# Patient Record
Sex: MALE | Race: WHITE | NOT HISPANIC OR LATINO | Employment: FULL TIME | URBAN - METROPOLITAN AREA
[De-identification: names, ages, dates, MRNs, and addresses within clinical notes are randomized per-mention and may not be internally consistent; named-entity substitution may affect disease eponyms.]

---

## 2017-10-10 ENCOUNTER — OFFICE VISIT (OUTPATIENT)
Dept: SLEEP MEDICINE | Facility: HOSPITAL | Age: 42
End: 2017-10-10

## 2017-10-10 VITALS
BODY MASS INDEX: 32.58 KG/M2 | DIASTOLIC BLOOD PRESSURE: 82 MMHG | WEIGHT: 262 LBS | HEART RATE: 64 BPM | SYSTOLIC BLOOD PRESSURE: 142 MMHG | HEIGHT: 75 IN | OXYGEN SATURATION: 98 %

## 2017-10-10 DIAGNOSIS — G47.33 OBSTRUCTIVE SLEEP APNEA, ADULT: ICD-10-CM

## 2017-10-10 DIAGNOSIS — G47.31 CENTRAL SLEEP APNEA: Primary | ICD-10-CM

## 2017-10-10 PROBLEM — I10 HYPERTENSION: Status: ACTIVE | Noted: 2017-10-10

## 2017-10-10 PROCEDURE — 99213 OFFICE O/P EST LOW 20 MIN: CPT | Performed by: INTERNAL MEDICINE

## 2017-10-10 NOTE — PROGRESS NOTES
"Subjective   Steven Hutson is a 41 y.o. male is here today for follow-up.  He is followed here with central sleep apnea.  His primary care physician is Dr. Dias.    History of Present Illness  Patient was last seen September 30, 2016.  He had a history as Central sleep apnea and is been on an ASV device.  He has history of obesity and hypertension.  He states he's been doing very well with his machine.  He denies any problems with machine or mask.  He continues to feel more rested when he uses machine.  Past Medical History:   Diagnosis Date   • Hypertension        Past Surgical History:   Procedure Laterality Date   • KNEE SURGERY             Current Outpatient Prescriptions:   •  allopurinol (ZYLOPRIM) 300 MG tablet, Take 300 mg by mouth daily., Disp: , Rfl:   •  amLODIPine (NORVASC) 10 MG tablet, , Disp: , Rfl:   •  Omega-3 Fatty Acids (FISH OIL) 1000 MG capsule capsule, Take  by mouth daily with breakfast., Disp: , Rfl:   •  vitamin C (ASCORBIC ACID) 500 MG tablet, Take 500 mg by mouth daily., Disp: , Rfl:     No Known Allergies    The following portions of the patient's history were reviewed and updated as appropriate: allergies, current medications and problem list.    Review of Systems   Constitutional: Positive for diaphoresis.   HENT: Positive for congestion.    Eyes: Negative.    Respiratory: Negative.    Cardiovascular: Negative.    Gastrointestinal: Negative.    Endocrine: Negative.    Genitourinary: Negative.    Musculoskeletal: Negative.    Skin: Negative.    Allergic/Immunologic: Positive for environmental allergies.   Neurological: Negative.    Hematological: Negative.    Psychiatric/Behavioral: Negative.    [place] scores 5/24    Objective     /82  Pulse 64  Ht 75\" (190.5 cm)  Wt 262 lb (119 kg)  SpO2 98%  BMI 32.75 kg/m2    Physical Exam   Constitutional: He is oriented to person, place, and time. He appears well-developed and well-nourished.   He is obese.   HENT:   Head: Normocephalic " and atraumatic.   He has Mallampati class IV anatomy and mild retrognathia.   Eyes: EOM are normal. Pupils are equal, round, and reactive to light.   Neck: Normal range of motion. Neck supple.   Cardiovascular: Normal rate, regular rhythm and normal heart sounds.    Pulmonary/Chest: Effort normal and breath sounds normal.   Abdominal: Soft. Bowel sounds are normal.   Musculoskeletal: Normal range of motion. He exhibits no edema.   Neurological: He is alert and oriented to person, place, and time.   Skin: Skin is warm and dry.   Psychiatric: He has a normal mood and affect. His behavior is normal.    download from his machine for the past 180 days shows usage every day.  He uses it 7 hours 30 minutes per day.  His AHI is normal at 0.8      Assessment/Plan   Steven was seen today for follow-up.    Diagnoses and all orders for this visit:    Central sleep apnea    Obstructive sleep apnea, adult  -     CPAP Therapy    Patient seems be doing very well on this current CPAP pressures.  We will renew his supplies.  He is encouraged to lose weight.  He is encouraged to avoid alcohol and sedatives close to bedtime.  He is encouraged practice lateral position sleep.  We will see him back in 1 year.  He is to contact us earlier symptoms worsen.             Asif Cavazos MD [facility]  Sleep Medicine  Pulmonary and Critical Care Medicine      10/10/17  10:43 AM

## 2017-10-10 NOTE — PATIENT INSTRUCTIONS
Sleep Apnea  Sleep apnea is a condition in which breathing pauses or becomes shallow during sleep. Episodes of sleep apnea usually last 10 seconds or longer, and they may occur as many as 20 times an hour. Sleep apnea disrupts your sleep and keeps your body from getting the rest that it needs. This condition can increase your risk of certain health problems, including:  · Heart attack.  · Stroke.  · Obesity.  · Diabetes.  · Heart failure.  · Irregular heartbeat.  There are three kinds of sleep apnea:  · Obstructive sleep apnea. This kind is caused by a blocked or collapsed airway.  · Central sleep apnea. This kind happens when the part of the brain that controls breathing does not send the correct signals to the muscles that control breathing.  · Mixed sleep apnea. This is a combination of obstructive and central sleep apnea.  CAUSES  The most common cause of this condition is a collapsed or blocked airway. An airway can collapse or become blocked if:  · Your throat muscles are abnormally relaxed.  · Your tongue and tonsils are larger than normal.  · You are overweight.  · Your airway is smaller than normal.  RISK FACTORS  This condition is more likely to develop in people who:  · Are overweight.  · Smoke.  · Have a smaller than normal airway.  · Are elderly.  · Are male.  · Drink alcohol.  · Take sedatives or tranquilizers.  · Have a family history of sleep apnea.  SYMPTOMS  Symptoms of this condition include:  · Trouble staying asleep.  · Daytime sleepiness and tiredness.  · Irritability.  · Loud snoring.  · Morning headaches.  · Trouble concentrating.  · Forgetfulness.  · Decreased interest in sex.  · Unexplained sleepiness.  · Mood swings.  · Personality changes.  · Feelings of depression.  · Waking up often during the night to urinate.  · Dry mouth.  · Sore throat.  DIAGNOSIS  This condition may be diagnosed with:  · A medical history.  · A physical exam.  · A series of tests that are done while you are  sleeping (sleep study). These tests are usually done in a sleep lab, but they may also be done at home.  TREATMENT  Treatment for this condition aims to restore normal breathing and to ease symptoms during sleep. It may involve managing health issues that can affect breathing, such as high blood pressure or obesity. Treatment may include:  · Sleeping on your side.  · Using a decongestant if you have nasal congestion.  · Avoiding the use of depressants, including alcohol, sedatives, and narcotics.  · Losing weight if you are overweight.  · Making changes to your diet.  · Quitting smoking.  · Using a device to open your airway while you sleep, such as:    An oral appliance. This is a custom-made mouthpiece that shifts your lower jaw forward.    A continuous positive airway pressure (CPAP) device. This device delivers oxygen to your airway through a mask.    A nasal expiratory positive airway pressure (EPAP) device. This device has valves that you put into each nostril.    A bi-level positive airway pressure (BPAP) device. This device delivers oxygen to your airway through a mask.  · Surgery if other treatments do not work. During surgery, excess tissue is removed to create a wider airway.  It is important to get treatment for sleep apnea. Without treatment, this condition can lead to:  · High blood pressure.  · Coronary artery disease.  · (Men) An inability to achieve or maintain an erection (impotence).  · Reduced thinking abilities.  HOME CARE INSTRUCTIONS  · Make any lifestyle changes that your health care provider recommends.  · Eat a healthy, well-balanced diet.  · Take over-the-counter and prescription medicines only as told by your health care provider.  · Avoid using depressants, including alcohol, sedatives, and narcotics.  · Take steps to lose weight if you are overweight.  · If you were given a device to open your airway while you sleep, use it only as told by your health care provider.  · Do not use any  tobacco products, such as cigarettes, chewing tobacco, and e-cigarettes. If you need help quitting, ask your health care provider.  · Keep all follow-up visits as told by your health care provider. This is important.  SEEK MEDICAL CARE IF:  · The device that you received to open your airway during sleep is uncomfortable or does not seem to be working.  · Your symptoms do not improve.  · Your symptoms get worse.  SEEK IMMEDIATE MEDICAL CARE IF:  · You develop chest pain.  · You develop shortness of breath.  · You develop discomfort in your back, arms, or stomach.  · You have trouble speaking.  · You have weakness on one side of your body.  · You have drooping in your face.  These symptoms may represent a serious problem that is an emergency. Do not wait to see if the symptoms will go away. Get medical help right away. Call your local emergency services (911 in the [place] Do not drive yourself to the hospital.     This information is not intended to replace advice given to you by your health care provider. Make sure you discuss any questions you have with your health care provider.     Document Released: 12/08/2003 Document Revised: 04/10/2017 Document Reviewed: 09/26/2016  [employer] Interactive Patient Education ©2017 [employer]

## 2018-09-27 ENCOUNTER — OFFICE VISIT (OUTPATIENT)
Dept: SLEEP MEDICINE | Facility: HOSPITAL | Age: 43
End: 2018-09-27

## 2018-09-27 VITALS
DIASTOLIC BLOOD PRESSURE: 93 MMHG | HEART RATE: 77 BPM | BODY MASS INDEX: 31.68 KG/M2 | OXYGEN SATURATION: 97 % | HEIGHT: 75 IN | SYSTOLIC BLOOD PRESSURE: 150 MMHG | WEIGHT: 254.8 LBS

## 2018-09-27 DIAGNOSIS — G47.33 OSA (OBSTRUCTIVE SLEEP APNEA): Primary | ICD-10-CM

## 2018-09-27 PROCEDURE — 99213 OFFICE O/P EST LOW 20 MIN: CPT | Performed by: NURSE PRACTITIONER

## 2018-09-27 RX ORDER — TESTOSTERONE CYPIONATE 200 MG/ML
INJECTION, SOLUTION INTRAMUSCULAR
Refills: 1 | COMMUNITY
Start: 2018-06-29

## 2018-09-27 RX ORDER — NEEDLES, DISPOSABLE 25GX5/8"
NEEDLE, DISPOSABLE MISCELLANEOUS
Refills: 3 | COMMUNITY
Start: 2018-07-06

## 2018-09-27 RX ORDER — ROSUVASTATIN CALCIUM 40 MG/1
TABLET, COATED ORAL
Refills: 3 | COMMUNITY
Start: 2018-06-28

## 2018-09-27 NOTE — PATIENT INSTRUCTIONS

## 2018-09-27 NOTE — PROGRESS NOTES
"Subjective: Follow-up        Chief Complaint:   Chief Complaint   Patient presents with   • Follow-up       HPI:    Steven Hutson is a 42 y.o. male here for follow-up of central sleep apnea.  Patient is currently using an ASV, he was last seen here 10/10/17.  Patient otherwise is doing very well with the ASV therapy.  He is sleeping approximately 6 hours nightly and feels very refreshed upon awakening.  His [place] score 0/24.  He does not snore.  He does not nap.  He thinks he is doing very well on his current therapy and wishes to continue.        Current medications are:   Current Outpatient Prescriptions:   •  allopurinol (ZYLOPRIM) 300 MG tablet, Take 300 mg by mouth daily., Disp: , Rfl:   •  amLODIPine (NORVASC) 10 MG tablet, , Disp: , Rfl:   •  B-D DISP NEEDLE 09EK7-1/4\" 23G X 1-1/4\" misc, , Disp: , Rfl: 3  •  Omega-3 Fatty Acids (FISH OIL) 1000 MG capsule capsule, Take  by mouth daily with breakfast., Disp: , Rfl:   •  rosuvastatin (CRESTOR) 40 MG tablet, , Disp: , Rfl: 3  •  Testosterone Cypionate (DEPOTESTOTERONE CYPIONATE) 200 MG/ML injection, , Disp: , Rfl: 1  •  vitamin C (ASCORBIC ACID) 500 MG tablet, Take 500 mg by mouth daily., Disp: , Rfl: .      The patient's relevant past medical, surgical, family and social history were reviewed and updated in Epic as appropriate.       Review of Systems   Constitutional: Negative for diaphoresis and fatigue.   HENT: Positive for congestion, postnasal drip and rhinorrhea.    Eyes: Positive for visual disturbance.   Respiratory: Positive for apnea and cough.    Psychiatric/Behavioral: Positive for sleep disturbance.   All other systems reviewed and are negative.        Objective:    Physical Exam   Constitutional: He is oriented to person, place, and time. He appears well-developed and well-nourished.   HENT:   Head: Normocephalic and atraumatic.   Mouth/Throat: Oropharynx is clear and moist.   Mallampati 4 anatomy   Eyes: Conjunctivae are normal.   Neck: Neck " supple. No thyromegaly present.   Cardiovascular: Normal rate and regular rhythm.    Pulmonary/Chest: Effort normal and breath sounds normal.   Lymphadenopathy:     He has no cervical adenopathy.   Neurological: He is alert and oriented to person, place, and time.   Skin: Skin is warm and dry.   Psychiatric: He has a normal mood and affect. His behavior is normal. Judgment and thought content normal.   Nursing note and vitals reviewed.    Download a compliance reviewed with patient.  Usage greater than 4 hours is 98.9% to controlled AHI of 0.8.    ASSESSMENT/PLAN    Steven was seen today for follow-up.    Diagnoses and all orders for this visit:    [place] (obstructive sleep apnea)  -     CPAP Therapy            1. Counseled patient regarding multimodal approach with healthy nutrition, healthy sleep, regular physical activity, social activities, counseling, and medications. Encouraged to practice lateral sleep position. Avoid alcohol and sedatives close to bedtime.  2. Refill supplies ×1 year.  3. Return to clinic one year or sooner if symptoms warrant.    I have reviewed the results of my evaluation and impression and discussed my recommendations in detail with the patient.      Signed by  PAUL Lindsay    September 27, 2018      CC: Sarah Dias MD          No ref. provider found

## 2019-09-16 ENCOUNTER — OFFICE VISIT (OUTPATIENT)
Dept: SLEEP MEDICINE | Facility: HOSPITAL | Age: 44
End: 2019-09-16

## 2019-09-16 VITALS
BODY MASS INDEX: 31.83 KG/M2 | WEIGHT: 256 LBS | OXYGEN SATURATION: 98 % | HEIGHT: 75 IN | HEART RATE: 61 BPM | DIASTOLIC BLOOD PRESSURE: 83 MMHG | SYSTOLIC BLOOD PRESSURE: 146 MMHG

## 2019-09-16 DIAGNOSIS — G47.31 CENTRAL SLEEP APNEA: Primary | ICD-10-CM

## 2019-09-16 PROCEDURE — 99212 OFFICE O/P EST SF 10 MIN: CPT | Performed by: NURSE PRACTITIONER

## 2019-09-16 NOTE — PROGRESS NOTES
"    Chief Complaint:   Chief Complaint   Patient presents with   • Follow-up       HPI:    Steven Hutson is a 43 y.o. male here for follow-up of central sleep apnea.  Patient was last seen 9/27/2018.  Patient continues with ASV therapy.  Patient sleeps 6 to 8 hours nightly and feels refreshed upon awakening.  Patient has no excessive daytime sleepiness.  Patient has an [place] score of 0/24.  Patient has no concerns or questions today and wishes to continue with ASV.        Current medications are:   Current Outpatient Medications:   •  allopurinol (ZYLOPRIM) 300 MG tablet, Take 300 mg by mouth daily., Disp: , Rfl:   •  amLODIPine (NORVASC) 10 MG tablet, , Disp: , Rfl:   •  B-D DISP NEEDLE 52LG8-4/4\" 23G X 1-1/4\" misc, , Disp: , Rfl: 3  •  Omega-3 Fatty Acids (FISH OIL) 1000 MG capsule capsule, Take  by mouth daily with breakfast., Disp: , Rfl:   •  rosuvastatin (CRESTOR) 40 MG tablet, , Disp: , Rfl: 3  •  Testosterone Cypionate (DEPOTESTOTERONE CYPIONATE) 200 MG/ML injection, , Disp: , Rfl: 1  •  vitamin C (ASCORBIC ACID) 500 MG tablet, Take 500 mg by mouth daily., Disp: , Rfl: .      The patient's relevant past medical, surgical, family and social history were reviewed and updated in Epic as appropriate.       Review of Systems   Eyes: Positive for visual disturbance.   Respiratory: Positive for apnea.    Psychiatric/Behavioral: Positive for sleep disturbance.   All other systems reviewed and are negative.        Objective:    Physical Exam   Constitutional: He is oriented to person, place, and time. He appears well-developed and well-nourished.   HENT:   Head: Normocephalic and atraumatic.   Mouth/Throat: Oropharynx is clear and moist.   Mallampati 4 anatomy   Eyes: Conjunctivae are normal.   Neck: Neck supple. No thyromegaly present.   Cardiovascular: Normal rate and regular rhythm.   Pulmonary/Chest: Effort normal and breath sounds normal.   Lymphadenopathy:     He has no cervical adenopathy.   Neurological: He " is alert and oriented to person, place, and time.   Skin: Skin is warm and dry.   Psychiatric: He has a normal mood and affect. His behavior is normal. Judgment and thought content normal.   Nursing note and vitals reviewed.  180/1 80 days of use.  Greater than 4-hour use 100%.  90% EPAP 9.7.  90% pressure support 2.0.  AHI 0.6.  Download reviewed with patient.      ASSESSMENT/PLAN    Steven was seen today for follow-up.    Diagnoses and all orders for this visit:    Central sleep apnea  -     CPAP Therapy            1. Counseled patient regarding multimodal approach with healthy nutrition, healthy sleep, regular physical activity, social activities, counseling, and medications. Encouraged to practice lateral sleep position. Avoid alcohol and sedatives close to bedtime.  2. Refill supplies x1 year.  Return to clinic 1 year or sooner if symptoms warrant.    I have reviewed the results of my evaluation and impression and discussed my recommendations in detail with the patient.      Signed by  PAUL Lindsay    September 16, 2019      CC: Sarah Dias MD          No ref. provider found

## 2020-10-19 ENCOUNTER — TELEMEDICINE (OUTPATIENT)
Dept: SLEEP MEDICINE | Facility: HOSPITAL | Age: 45
End: 2020-10-19

## 2020-10-19 VITALS — WEIGHT: 260 LBS | HEIGHT: 75 IN | BODY MASS INDEX: 32.33 KG/M2

## 2020-10-19 DIAGNOSIS — G47.31 PRIMARY CENTRAL SLEEP APNEA: Primary | ICD-10-CM

## 2020-10-19 PROCEDURE — 99212 OFFICE O/P EST SF 10 MIN: CPT | Performed by: NURSE PRACTITIONER

## 2020-10-19 NOTE — PROGRESS NOTES
"    Chief Complaint:   Chief Complaint   Patient presents with   • Follow-up       HPI:    Steven Hutson is a 44 y.o. male here for follow-up of central sleep apnea.  Patient was last seen 9/16/2019 and is on ASV therapy.  Patient states he is doing very well.  Patient is sleeping 6 to 8 hours nightly and does feel rested upon awakening.  Patient will go to sleep within 15 minutes and does not get up during the night.  Patient has an [place] score of 0/24.  Patient denies any new diagnoses.  Patient has no complaints and does wish to continue ASV therapy.      Current medications are:   Current Outpatient Medications:   •  allopurinol (ZYLOPRIM) 300 MG tablet, Take 300 mg by mouth daily., Disp: , Rfl:   •  amLODIPine (NORVASC) 10 MG tablet, , Disp: , Rfl:   •  B-D DISP NEEDLE 64IM3-0/4\" 23G X 1-1/4\" misc, , Disp: , Rfl: 3  •  Omega-3 Fatty Acids (FISH OIL) 1000 MG capsule capsule, Take  by mouth daily with breakfast., Disp: , Rfl:   •  rosuvastatin (CRESTOR) 40 MG tablet, , Disp: , Rfl: 3  •  Testosterone Cypionate (DEPOTESTOTERONE CYPIONATE) 200 MG/ML injection, , Disp: , Rfl: 1  •  vitamin C (ASCORBIC ACID) 500 MG tablet, Take 500 mg by mouth daily., Disp: , Rfl: .      The patient's relevant past medical, surgical, family and social history were reviewed and updated in Epic as appropriate.       Review of Systems   Eyes: Positive for visual disturbance.   Psychiatric/Behavioral: Positive for sleep disturbance.   All other systems reviewed and are negative.        Objective:    Physical Exam  Constitutional:       Appearance: Normal appearance. He is obese.   HENT:      Head: Normocephalic and atraumatic.      Mouth/Throat:      Comments: Class 4 airway  Eyes:      Conjunctiva/sclera: Conjunctivae normal.   Pulmonary:      Effort: Pulmonary effort is normal. No respiratory distress.   Skin:     Coloration: Skin is not pale.      Findings: No erythema.   Neurological:      Mental Status: He is alert and oriented to " person, place, and time.   Psychiatric:         Mood and Affect: Mood normal.         Behavior: Behavior normal.         Thought Content: Thought content normal.         Judgment: Judgment normal.     90/90 days of use  Greater than 4-hour use 100%  90% EPAP 9.3  90% pressure support 2.3  Periodic breathing 0%  AHI 0.7      ASSESSMENT/PLAN    Diagnoses and all orders for this visit:    1. Primary central sleep apnea (Primary)  -     CPAP Therapy            1. Counseled patient regarding multimodal approach with healthy nutrition, healthy sleep, regular physical activity, social activities, counseling, and medications. Encouraged to practice lateral sleep position. Avoid alcohol and sedatives close to bedtime.  2. Refill supplies x1 year.  Return to clinic 1 year or sooner if symptoms warrant.  Patient gave verbal consent for video visit    I have reviewed the results of my evaluation and impression and discussed my recommendations in detail with the patient.      Signed by  PAUL Lindsay    October 19, 2020      CC: Sarah Dias MD          No ref. provider found

## 2021-10-19 ENCOUNTER — OFFICE VISIT (OUTPATIENT)
Dept: SLEEP MEDICINE | Facility: HOSPITAL | Age: 46
End: 2021-10-19

## 2021-10-19 VITALS
SYSTOLIC BLOOD PRESSURE: 161 MMHG | WEIGHT: 263.6 LBS | HEIGHT: 75 IN | OXYGEN SATURATION: 97 % | HEART RATE: 67 BPM | DIASTOLIC BLOOD PRESSURE: 85 MMHG | BODY MASS INDEX: 32.78 KG/M2

## 2021-10-19 DIAGNOSIS — G47.31 CENTRAL SLEEP APNEA: Primary | ICD-10-CM

## 2021-10-19 PROCEDURE — 99213 OFFICE O/P EST LOW 20 MIN: CPT | Performed by: NURSE PRACTITIONER

## 2021-10-19 NOTE — PROGRESS NOTES
"    Chief Complaint:   Chief Complaint   Patient presents with   • Follow-up       HPI:    Steven Hutson is a 45 y.o. male here for follow-up of central sleep apnea.  Patient has a history of hypertension and hyperlipidemia.  Patient was last seen 10/1920 and was doing well with ASV therapy.  Patient is sleeping 6 to 8 hours nightly and does feel refreshed upon awakening.  Patient will go to sleep within 15 minutes and does not get up during the night.  Patient is having no concerns or complaints and wishes to continue ASV.        Current medications are:   Current Outpatient Medications:   •  allopurinol (ZYLOPRIM) 300 MG tablet, Take 300 mg by mouth daily., Disp: , Rfl:   •  amLODIPine (NORVASC) 10 MG tablet, , Disp: , Rfl:   •  B-D DISP NEEDLE 92ME0-4/4\" 23G X 1-1/4\" misc, , Disp: , Rfl: 3  •  Omega-3 Fatty Acids (FISH OIL) 1000 MG capsule capsule, Take  by mouth daily with breakfast., Disp: , Rfl:   •  rosuvastatin (CRESTOR) 40 MG tablet, , Disp: , Rfl: 3  •  Testosterone Cypionate (DEPOTESTOTERONE CYPIONATE) 200 MG/ML injection, , Disp: , Rfl: 1  •  vitamin C (ASCORBIC ACID) 500 MG tablet, Take 500 mg by mouth daily., Disp: , Rfl: .      The patient's relevant past medical, surgical, family and social history were reviewed and updated in Epic as appropriate.       Review of Systems   Eyes: Positive for visual disturbance.   Respiratory: Positive for apnea.    All other systems reviewed and are negative.        Objective:    Physical Exam  Constitutional:       Appearance: Normal appearance.   HENT:      Head: Normocephalic and atraumatic.      Mouth/Throat:      Pharynx: Oropharynx is clear.      Comments: Mallampati 4 anatomy  Eyes:      Conjunctiva/sclera: Conjunctivae normal.   Pulmonary:      Effort: Pulmonary effort is normal.      Breath sounds: Normal breath sounds.   Skin:     General: Skin is warm and dry.   Neurological:      Mental Status: He is oriented to person, place, and time.   Psychiatric:    "      Mood and Affect: Mood normal.         Behavior: Behavior normal.         Thought Content: Thought content normal.         Judgment: Judgment normal.       90/90 days of use  Greater than 4-hour use 100%  Average EPAP 7.2  Average pressure support 0.9  9% EPAP 10.8  9% pressure support 2.0  AHI 0.5    ASSESSMENT/PLAN    Diagnoses and all orders for this visit:    1. Central sleep apnea (Primary)  -     CPAP Therapy            1. Counseled patient regarding multimodal approach with healthy nutrition, healthy sleep, regular physical activity, social activities, counseling, and medications. Encouraged to practice lateral sleep position. Avoid alcohol and sedatives close to bedtime.  2.   Refill supplies x1 year.  Return to clinic 1 year or sooner symptoms warrant.  I have reviewed the results of my evaluation and impression and discussed my recommendations in detail with the patient.      Signed by  PAUL Lindsay    October 19, 2021      CC: Sarah Dias MD          No ref. provider found

## 2022-10-20 ENCOUNTER — TELEPHONE (OUTPATIENT)
Dept: SLEEP MEDICINE | Facility: HOSPITAL | Age: 47
End: 2022-10-20

## 2022-10-20 NOTE — TELEPHONE ENCOUNTER
Hub staff attempted to follow warm transfer process and was unsuccessful     Caller: Steven Hutson    Relationship to patient: Self    Best call back number:[phone]    Patient is needing: PT MOVED TO [place] AND NEEDS TO XFER HIS PRESCRIPTIONS.

## 2024-07-14 ENCOUNTER — HOSPITAL ENCOUNTER (EMERGENCY)
Facility: HOSPITAL | Age: 49
Discharge: HOME | End: 2024-07-14
Payer: COMMERCIAL

## 2024-07-14 VITALS
TEMPERATURE: 97.1 F | WEIGHT: 280 LBS | OXYGEN SATURATION: 97 % | HEIGHT: 75 IN | SYSTOLIC BLOOD PRESSURE: 157 MMHG | HEART RATE: 77 BPM | RESPIRATION RATE: 16 BRPM | DIASTOLIC BLOOD PRESSURE: 93 MMHG | BODY MASS INDEX: 34.82 KG/M2

## 2024-07-14 DIAGNOSIS — S46.211A BICEPS MUSCLE TEAR, RIGHT, INITIAL ENCOUNTER: Primary | ICD-10-CM

## 2024-07-14 PROCEDURE — 99282 EMERGENCY DEPT VISIT SF MDM: CPT

## 2024-07-14 NOTE — ED PROVIDER NOTES
Subjective   History of Present Illness  48-year-old male with hypertension and gout presenting with acute biceps injury.  Patient states he was lifting a car tire when he felt a tearing sensation in his right bicep.  Onset of symptoms were approximately 3 hours ago.  Patient states the pain at this point is mild unless he starts using his arm.  Patient notices a visual defect of his right bicep at the time of examination.  There is no numbness or tingling neurovascularly patient is intact.      Review of Systems   Constitutional: Negative.  Negative for fever.   HENT: Negative.     Respiratory: Negative.     Cardiovascular: Negative.  Negative for chest pain.   Gastrointestinal: Negative.  Negative for abdominal pain.   Endocrine: Negative.    Genitourinary: Negative.  Negative for dysuria.   Musculoskeletal:         Bicep pain and swelling   Skin: Negative.    Neurological: Negative.    Psychiatric/Behavioral: Negative.     All other systems reviewed and are negative.      Past Medical History:   Diagnosis Date    Hypertension        No Known Allergies    Past Surgical History:   Procedure Laterality Date    KNEE SURGERY         Family History   Problem Relation Age of Onset    Hypertension Mother     Hypertension Father        Social History     Socioeconomic History    Marital status:    Tobacco Use    Smoking status: Never    Smokeless tobacco: Current     Types: Snuff   Substance and Sexual Activity    Alcohol use: Yes     Comment: Socially     Drug use: No           Objective   Physical Exam  Vitals and nursing note reviewed.   Constitutional:       General: He is not in acute distress.     Appearance: He is well-developed. He is not diaphoretic.   HENT:      Head: Normocephalic and atraumatic.      Right Ear: External ear normal.      Left Ear: External ear normal.      Nose: Nose normal.   Eyes:      Conjunctiva/sclera: Conjunctivae normal.      Pupils: Pupils are equal, round, and reactive to light.    Neck:      Vascular: No JVD.      Trachea: No tracheal deviation.   Cardiovascular:      Rate and Rhythm: Normal rate and regular rhythm.      Heart sounds: Normal heart sounds. No murmur heard.  Pulmonary:      Effort: Pulmonary effort is normal. No respiratory distress.      Breath sounds: Normal breath sounds. No wheezing.   Abdominal:      General: Bowel sounds are normal.      Palpations: Abdomen is soft.      Tenderness: There is no abdominal tenderness.   Musculoskeletal:         General: Swelling, tenderness and deformity present. Normal range of motion.      Cervical back: Normal range of motion and neck supple.   Skin:     General: Skin is warm and dry.      Coloration: Skin is not pale.      Findings: No erythema or rash.   Neurological:      Mental Status: He is alert and oriented to person, place, and time.      Cranial Nerves: No cranial nerve deficit.   Psychiatric:         Behavior: Behavior normal.         Thought Content: Thought content normal.         Procedures           ED Course                                             Medical Decision Making  48-year-old male with hypertension and gout presenting with a torn right bicep.  Discussed with patient obtaining imaging.  However it was decided that patient is going to follow-up with an orthopedic surgeon on Monday which she has an established relationship with in [place].  Patient was placed in a sling and counseled on what to expect with a torn bicep.  All questions answered.  Patient understands the importance of prompt follow-up with orthopedic surgery at this time.        Final diagnoses:   Biceps muscle tear, right, initial encounter       ED Disposition  ED Disposition       ED Disposition   Discharge    Condition   Stable    Comment   --               Sarah Dias MD  [address]  [address]  [facility] [address]  [phone]    Schedule an appointment as soon as possible for a visit in 1 day      your orthopedist  Follow up  tomorrow with your surgeon.  Schedule an appointment as soon as possible for a visit in 1 day           Medication List      No changes were made to your prescriptions during this visit.            Josh Boss, DO  07/14/24 [id number]

## 2024-07-14 NOTE — DISCHARGE INSTRUCTIONS
You have a torn head of the right bicep.  Please follow-up with your orthopedic surgeon.  I recommend naproxen over-the-counter twice daily as needed for pain.  Take with food and water.  You may ice the area to decrease pain and inflammation.

## 2025-01-13 ENCOUNTER — OFFICE VISIT (OUTPATIENT)
Dept: SLEEP MEDICINE | Facility: CLINIC | Age: 50
End: 2025-01-13
Payer: COMMERCIAL

## 2025-01-13 VITALS
DIASTOLIC BLOOD PRESSURE: 90 MMHG | HEART RATE: 68 BPM | WEIGHT: 278 LBS | HEIGHT: 75 IN | TEMPERATURE: 98 F | BODY MASS INDEX: 34.57 KG/M2 | OXYGEN SATURATION: 98 % | SYSTOLIC BLOOD PRESSURE: 154 MMHG

## 2025-01-13 DIAGNOSIS — G47.31 CENTRAL SLEEP APNEA DUE TO SUBSTANCE: Primary | ICD-10-CM

## 2025-01-13 DIAGNOSIS — T50.905A CENTRAL SLEEP APNEA DUE TO SUBSTANCE: Primary | ICD-10-CM

## 2025-01-13 PROCEDURE — 99203 OFFICE O/P NEW LOW 30 MIN: CPT | Performed by: NURSE PRACTITIONER

## 2025-01-13 NOTE — PROGRESS NOTES
"Chief Complaint:   Chief Complaint   Patient presents with    Follow-up    Sleep Apnea       HPI:    Steven Hutson is a 49 y.o. male here for follow-up of Jose Enrique apnea.  Patient was last seen 10/19/2024 for central sleep apnea.  Patient does well with ASV therapy.  Patient did move to [place] but is now back in [place].  He is sleeping 6 to 8 hours nightly.  He goes to sleep within 15 minutes and does not get up during the night.  He is doing well with heated tubing and fullface mask.  He did receive a new machine while in [place].  He has no concerns or complaints and will continue therapy.        Current medications are:   Current Outpatient Medications:     allopurinol (ZYLOPRIM) 300 MG tablet, Take 1 tablet by mouth Daily., Disp: , Rfl:     amLODIPine (NORVASC) 10 MG tablet, , Disp: , Rfl:     B-D DISP NEEDLE 32RY6-6/4\" 23G X 1-1/4\" misc, , Disp: , Rfl: 3    Omega-3 Fatty Acids (FISH OIL) 1000 MG capsule capsule, Take  by mouth daily with breakfast., Disp: , Rfl:     rosuvastatin (CRESTOR) 40 MG tablet, , Disp: , Rfl: 3    Testosterone Cypionate (DEPOTESTOTERONE CYPIONATE) 200 MG/ML injection, , Disp: , Rfl: 1    vitamin C (ASCORBIC ACID) 500 MG tablet, Take 1 tablet by mouth Daily., Disp: , Rfl: .      The patient's relevant past medical, surgical, family and social history were reviewed and updated in Epic as appropriate.       Review of Systems   Eyes:  Positive for visual disturbance.   Respiratory:  Positive for apnea.    Psychiatric/Behavioral:  Positive for sleep disturbance.    All other systems reviewed and are negative.        Objective:    Physical Exam  Constitutional:       Appearance: Normal appearance.   HENT:      Head: Normocephalic and atraumatic.      Mouth/Throat:      Comments: Mallampati 4 anatomy  Cardiovascular:      Rate and Rhythm: Normal rate and regular rhythm.   Pulmonary:      Effort: Pulmonary effort is normal.      Breath sounds: Normal breath sounds.   Skin:     " "General: Skin is warm and dry.   Neurological:      Mental Status: He is alert and oriented to person, place, and time.   Psychiatric:         Mood and Affect: Mood normal.         Behavior: Behavior normal.         Thought Content: Thought content normal.         Judgment: Judgment normal.       /90   Pulse 68   Temp 98 °F (36.7 °C)   Ht 190.5 cm (75\")   Wt 126 kg (278 lb)   SpO2 98%   BMI 34.75 kg/m²     CPAP Report    90/90 days of use  Greater than 4-hour use 100%  Minimum EPAP 4  Maximum IPAP 15  AHI of 0.3  The patient continues to use and benefit from CPAP therapy.    ASSESSMENT/PLAN    Diagnoses and all orders for this visit:    1. Central sleep apnea due to substance (Primary)  -     PAP Therapy        Counseled patient regarding multimodal approach with healthy nutrition, healthy sleep, regular physical activity, social activities, counseling, and medications. Encouraged to practice lateral sleep position. Avoid alcohol and sedatives close to bedtime.    Refill supplies x 1 year.  Return to clinic 1 year or sooner as symptoms warrant.      Signed by  PAUL Lindsay    January 13, 2025      CC: Provider, No Known         No ref. provider found      "